# Patient Record
Sex: MALE | Race: WHITE | URBAN - METROPOLITAN AREA
[De-identification: names, ages, dates, MRNs, and addresses within clinical notes are randomized per-mention and may not be internally consistent; named-entity substitution may affect disease eponyms.]

---

## 2017-12-04 ENCOUNTER — OFFICE VISIT (OUTPATIENT)
Dept: INTERNAL MEDICINE CLINIC | Age: 22
End: 2017-12-04

## 2017-12-04 VITALS
BODY MASS INDEX: 18.79 KG/M2 | HEIGHT: 68 IN | OXYGEN SATURATION: 99 % | WEIGHT: 124 LBS | HEART RATE: 91 BPM | SYSTOLIC BLOOD PRESSURE: 119 MMHG | DIASTOLIC BLOOD PRESSURE: 84 MMHG

## 2017-12-04 DIAGNOSIS — F41.9 ANXIETY AND DEPRESSION: ICD-10-CM

## 2017-12-04 DIAGNOSIS — F32.A ANXIETY AND DEPRESSION: ICD-10-CM

## 2017-12-04 DIAGNOSIS — Z00.00 PHYSICAL EXAM: Primary | ICD-10-CM

## 2017-12-04 DIAGNOSIS — M25.512 ACUTE PAIN OF LEFT SHOULDER: ICD-10-CM

## 2017-12-04 RX ORDER — ALPRAZOLAM 0.25 MG/1
0.25 TABLET ORAL
Qty: 60 TAB | Refills: 0 | Status: SHIPPED | OUTPATIENT
Start: 2017-12-04

## 2017-12-04 NOTE — PROGRESS NOTES
Subjective:  Mr. Danis Hansen is a pleasant 20year old gentleman who comes in today for a complete history and physical.    History of Present Illness:  He does give a three day history of left shoulder pain. He denied any injury and was not aware of any precipitating factors. He thinks he might have just slept on the wrong way on this left shoulder. He denies any neck pain. He denies any numbness or tingling down the left arm. He denies any weakness. The pain is worse if he tries to lift up his arm. He has not tried any OTC medication. A second complaint is his ongoing anxiety. In the past he was treated for depression and gradually took himself off his Sertraline. He has been off the medication for about a year and is doing well. He also is concerned he could have ADHD since his mother has been treated in the past.  At times he's having difficulty remaining focused. He denies any suicidal thoughts. Past Medical History/Surgeries:  None. Illnesses:  1. History of depression as noted above. 2. History of migraine headaches, although he's not been bothered in many months. Family History:  Father is in his 62s, alive with mild dementia and OCD. Mother is in her 46s, alive with ADHD. Two siblings are living and well. Social History:  He is single. 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 He lives alone. Allergies: To Bactrim, which causes rash. Medications:  None. Habits:  Nonsmoker and denies any use of street drugs. He drinks very little. Review of Systems:  HEENT:  As noted previously, he did have a history of migraine headaches, but tells me he's not been bothered in a long time. He denies any dizziness or blurred vision. He does wear glasses. His last eye exam was about a year ago. CVR:  Denies any chest pain, but does note occasional palpitations if he gets anxious. He denies any syncopal episode. He denies any shortness of breath, cough, wheezing, PND or orthopnea. Denies any ankle edema. GI:  Appetite is good. His weight is stable. Does have a normal bowel pattern without presence of blood in his stools or melena. :  He denies any urinary symptoms. Immunizations:  He has already gotten his flu vaccine. Physical Examination:  GENERAL:  On exam he is a pleasant, WDWN, young gentleman in NAD. He is alert and oriented. VITALS:  BP: 119/84. P: 91.  O2 sat: 99. WT: 124 lbs. HT: 5'8\". HEENT:  Normocephalic, atraumatic. PERRLA, EOMI. TMs normal.  Mouth mucosa pink. Tongue midline. Pharynx normal.  NECK:  Supple without adenopathy, thyromegaly or carotid bruits. CHEST:  Lungs were clear to auscultation, no rales or wheezes. CARDIAC:  Heart regular rhythm without murmur or gallop. ABDOMEN:  Bowel sounds present. Soft, non tender, no organomegaly or masses. EXTREMITIES:  No edema or calf tenderness. Distal pulses were present. Left shoulder - he does have full ROM, although he complains of a little bit of discomfort on forward elevation. No pain on palpation of the left shoulder. No rash, increase in warmth or redness noted. NEUROLOGIC:   He had excellent strength in the upper and lower extremities against resistance. Sensation is preserved. Reflexes are 2+ and symmetrical.  Romberg is negative. He had excellent coordination. Impression:  1. Left shoulder pain. 2. Chronic anxiety. Plan:  1. In terms of his shoulder he was asked to start using Aleve, two in the morning and two at bedtime with food. He may use warmth alternating with heat. Should he fail to improve over the next two weeks then he will return for xray of his shoulder and probable referral to ortho. 2. In terms of anxiety it was opted to start him on Xanax 0.25 mg twice daily as needed. He will contact me should he have any concern with the medication. 3. I also am referring him for ADHD testing. I will call him as soon as I have the results.   4. He was fasting this morning so therefore I did some baseline blood work and I will call him as soon as I have the results.

## 2017-12-04 NOTE — PROGRESS NOTES
Nilton Comes presents with   Chief Complaint   Patient presents with    Physical   Patient here for a physical.  He is fasting. Is taking no medications at this time. Is working as an office assist    1. Have you been to the ER, urgent care clinic since your last visit? Hospitalized since your last visit? No    2. Have you seen or consulted any other health care providers outside of the [address] [place] [address] since your last visit? Include any pap smears or colon screening.  No

## 2017-12-04 NOTE — PATIENT INSTRUCTIONS

## 2017-12-05 LAB
ALBUMIN SERPL-MCNC: 4.9 G/DL (ref 3.9–5.4)
ALKALINE PHOS POC: 77 U/L (ref 38–126)
ALT SERPL-CCNC: 22 U/L (ref 9–52)
AST SERPL-CCNC: 32 U/L (ref 14–36)
BACTERIA UA POCT, BACTPOCT: ABNORMAL
BILIRUB UR QL STRIP: NEGATIVE
BUN BLD-MCNC: 8 MG/DL (ref 9–20)
CALCIUM BLD-MCNC: 10 MG/DL (ref 8.4–10.2)
CASTS UA POCT: ABNORMAL
CHLORIDE BLD-SCNC: 101 MMOL/L (ref 98–107)
CHOLEST SERPL-MCNC: 131 MG/DL (ref 0–200)
CLUE CELLS, CLUEPOCT: NEGATIVE
CO2 POC: 27 MMOL/L (ref 22–32)
CREAT BLD-MCNC: 0.6 MG/DL (ref 0.8–1.5)
CRYSTALS UA POCT, CRYSPOCT: NEGATIVE
EGFR (POC): 141.7
EPITHELIAL CELLS POCT: NEGATIVE
GLUCOSE POC: 84 MG/DL (ref 75–110)
GLUCOSE UR-MCNC: NEGATIVE MG/DL
GRAN# POC: 1.9 K/UL (ref 2–7.8)
GRAN% POC: 47.3 % (ref 37–92)
HCT VFR BLD CALC: 44.8 % (ref 37–51)
HDLC SERPL-MCNC: 67 MG/DL (ref 35–130)
HGB BLD-MCNC: 15.2 G/DL (ref 12–18)
KETONES P FAST UR STRIP-MCNC: NEGATIVE MG/DL
LDL CHOLESTEROL POC: 36.8 MG/DL (ref 0–130)
LY# POC: 1.6 K/UL (ref 0.6–4.1)
LY% POC: 44.8 % (ref 10–58.5)
MCH RBC QN: 30.2 PG (ref 26–32)
MCHC RBC-ENTMCNC: 34 G/DL (ref 30–36)
MCV RBC: 89 FL (ref 80–97)
MID #, POC: 0.2 K/UL (ref 0–1.8)
MID% POC: 7.9 % (ref 0.1–24)
MUCUS UA POCT, MUCPOCT: ABNORMAL
NON-HDL GOAL (POC): 27.2
PH UR STRIP: 8 [PH] (ref 5–7)
PLATELET # BLD: 242 K/UL (ref 140–440)
POTASSIUM SERPL-SCNC: 4.9 MMOL/L (ref 3.6–5)
PROT SERPL-MCNC: 7.8 G/DL (ref 6.3–8.2)
PROT UR QL STRIP: NEGATIVE
RBC # BLD: 5.04 M/UL (ref 4.2–6.3)
RBC UA POCT, RBCPOCT: ABNORMAL
SODIUM SERPL-SCNC: 142 MMOL/L (ref 137–145)
SP GR UR STRIP: 1.01 (ref 1.01–1.02)
T4 FREE SERPL-MCNC: 0.93 NG/DL (ref 0.71–1.85)
TCHOL/HDL RATIO (POC): 2 (ref 0–4)
TOTAL BILIRUBIN POC: 1 MG/DL (ref 0.2–1.3)
TRICH UA POCT, TRICHPOC: NEGATIVE
TRIGL SERPL-MCNC: 184 MG/DL (ref 0–200)
TSH BLD-ACNC: 2.39 UIU/ML (ref 0.4–4.2)
UA UROBILINOGEN AMB POC: NORMAL (ref 0.2–1)
URINALYSIS CLARITY POC: CLEAR
URINALYSIS COLOR POC: ABNORMAL
URINE BLOOD POC: NEGATIVE
URINE CULT COMMENT, POCT: ABNORMAL
URINE LEUKOCYTES POC: NEGATIVE
URINE NITRITES POC: NEGATIVE
WBC # BLD: 3.7 K/UL (ref 4.1–10.9)
WBC UA POCT, WBCPOCT: 0
YEAST UA POCT, YEASTPOC: NEGATIVE

## 2018-04-10 ENCOUNTER — OFFICE VISIT (OUTPATIENT)
Dept: CARDIOLOGY CLINIC | Age: 23
End: 2018-04-10

## 2018-04-10 VITALS
HEART RATE: 78 BPM | DIASTOLIC BLOOD PRESSURE: 64 MMHG | OXYGEN SATURATION: 98 % | RESPIRATION RATE: 20 BRPM | BODY MASS INDEX: 18.7 KG/M2 | SYSTOLIC BLOOD PRESSURE: 90 MMHG | WEIGHT: 123.4 LBS | HEIGHT: 68 IN

## 2018-04-10 DIAGNOSIS — R00.2 HEART PALPITATIONS: Primary | ICD-10-CM

## 2018-04-10 DIAGNOSIS — R94.31 ABNORMAL EKG: ICD-10-CM

## 2018-04-10 NOTE — PROGRESS NOTES
HISTORY OF PRESENT ILLNESS  Bryson June is a 20 y.o. male. He is self referred for an abnormal EKG and palpitations. He was supposed to drink alcohol as part of a testing for DUI for a friend who is going to be in the [facility] but medical screening beforehand showed a heart rate of 120 beats per minute and may have been irregular. I do not have those strips. He decided to come here for further evaluation. He has no prior history of heart disease and no family history of heart disease. He does not exercise. He does not smoke cigarettes. He does drink two cups of coffee a day and about two alcoholic beverages a day. He works two jobs. He may have had a heart murmur at some point in the past.          HPI  Patient Active Problem List   Diagnosis Code    Heart palpitations R00.2     Current Outpatient Prescriptions   Medication Sig Dispense Refill    ALPRAZolam (XANAX) 0.25 mg tablet Take 1 Tab by mouth two (2) times daily as needed for Anxiety. Max Daily Amount: 0.5 mg. 60 Tab 0     Past Medical History:   Diagnosis Date    Depression     Infection of right ear lobe      History reviewed. No pertinent surgical history. Review of Systems   Cardiovascular: Positive for palpitations. All other systems reviewed and are negative. Visit Vitals    BP 90/64 (BP 1 Location: Left arm, BP Patient Position: Sitting)    Pulse 78    Resp 20    Ht 5' 8\" (1.727 m)    Wt 123 lb 6.4 oz (56 kg)    SpO2 98%    BMI 18.76 kg/m2       Physical Exam   Constitutional: He is oriented to person, place, and time. He appears well-nourished. HENT:   Head: Atraumatic. Eyes: Conjunctivae are normal.   Neck: Neck supple. Cardiovascular: Normal rate, regular rhythm and normal heart sounds. Exam reveals no gallop and no friction rub. No murmur heard. Pulmonary/Chest: Breath sounds normal. He has no wheezes. Abdominal: Bowel sounds are normal.   Musculoskeletal: He exhibits no edema.    Neurological: He is oriented to person, place, and time. Skin: Skin is dry. Nursing note and vitals reviewed. ASSESSMENT and PLAN  His exam and EKG are unremarkable for a person of his age. He does seem to have palpitations every day so I will order a 48 hour Holter monitor. He will also return for an echocardiogram and I will see him back in the office when testing is completed. I have asked him to try to reduce his intake of both alcohol and caffeine in the meantime.

## 2018-04-10 NOTE — MR AVS SNAPSHOT
[address] [facility] [address] [facility] 
[phone] Patient: Alexx Bass MRN: [id number] NE:[birth year] Visit Information Date & Time Provider Department Dept. Phone Encounter #  
 4/10/2018  9:20 AM Tate Li MD [facility] Myranda Banegas [phone] [id number] Your Appointments 12/10/2018  8:30 AM  
Complete Physical with AURELIO Aldana [facility] ([facility]) Appt Note: CPE, fasting [address] [id number] [address] [address] [address] Upcoming Health Maintenance Date Due DTaP/Tdap/Td series (1 - Tdap) 5/5/2015 Influenza Age 5 to Adult 8/1/2017 Allergies as of 4/10/2018  Review Complete On: 4/10/2018 By: Cat Barr RN Severity Noted Reaction Type Reactions Bactrim [Sulfamethoprim]  12/04/2017    Rash Current Immunizations  Never Reviewed No immunizations on file. Not reviewed this visit You Were Diagnosed With   
  
 Codes Comments Abnormal EKG    -  Primary ICD-10-CM: R94.31 
ICD-9-CM: 794.31 Vitals BP Pulse Resp Height(growth percentile) Weight(growth percentile) SpO2  
 90/64 (BP 1 Location: Left arm, BP Patient Position: Sitting) 78 20 5' 8\" (1.727 m) 123 lb 6.4 oz (56 kg) 98% BMI Smoking Status 18.76 kg/m2 Former Smoker Vitals History BMI and BSA Data Body Mass Index Body Surface Area 18.76 kg/m 2 1.64 m 2 Preferred Pharmacy Pharmacy Name Phone Wil [facility]eka [address], [address]  [address] [phone] Your Updated Medication List  
  
   
This list is accurate as of 4/10/18  9:26 AM.  Always use your most recent med list.  
  
  
  
  
 ALPRAZolam 0.25 mg tablet Commonly known as:  Marlon Oneill Take 1 Tab by mouth two (2) times daily as needed for Anxiety. Max Daily Amount: 0.5 mg. We Performed the Following AMB POC EKG ROUTINE W/ 12 LEADS, INTER & REP [79711 CPT(R)] Introducing [facility] & [facility] SERVICES! Dear Evy Donnelly: 
Thank you for requesting a [employer] account. Our records indicate that you already have an active [employer] account. You can access your account anytime at [email or web address] Did you know that you can access your hospital and ER discharge instructions at any time in [employer]? You can also review all of your test results from your hospital stay or ER visit. Additional Information If you have questions, please visit the Frequently Asked Questions section of the [employer] website at [email or web address] Remember, [employer] is NOT to be used for urgent needs. For medical emergencies, dial 911. Now available from your iPhone and Android! Please provide this summary of care documentation to your next provider. Your primary care clinician is listed as Isa Mejia. If you have any questions after today's visit, please call [phone].

## 2018-04-10 NOTE — PROGRESS NOTES
Chief Complaint   Patient presents with    Abnormal EKG     self referral.  Complaints of occasional chest discomfort/shortness of breath. Denies dizziness/swelling. 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 EKG done that revealed \"irregular rhythm\". Does not have copy of EKG.

## 2018-04-16 ENCOUNTER — CLINICAL SUPPORT (OUTPATIENT)
Dept: CARDIOLOGY CLINIC | Age: 23
End: 2018-04-16

## 2018-04-16 DIAGNOSIS — R00.2 PALPITATIONS: Primary | ICD-10-CM

## 2018-04-16 DIAGNOSIS — Q23.1 CONGENITAL BICUSPID AORTIC VALVE: ICD-10-CM

## 2018-04-24 ENCOUNTER — OFFICE VISIT (OUTPATIENT)
Dept: CARDIOLOGY CLINIC | Age: 23
End: 2018-04-24

## 2018-04-24 VITALS
HEART RATE: 88 BPM | DIASTOLIC BLOOD PRESSURE: 68 MMHG | WEIGHT: 123.8 LBS | HEIGHT: 68 IN | RESPIRATION RATE: 16 BRPM | BODY MASS INDEX: 18.76 KG/M2 | OXYGEN SATURATION: 99 % | SYSTOLIC BLOOD PRESSURE: 102 MMHG

## 2018-04-24 DIAGNOSIS — R00.2 HEART PALPITATIONS: ICD-10-CM

## 2018-04-24 DIAGNOSIS — R94.31 ABNORMAL EKG: ICD-10-CM

## 2018-04-24 DIAGNOSIS — Q23.1 CONGENITAL BICUSPID AORTIC VALVE: ICD-10-CM

## 2018-04-24 DIAGNOSIS — R00.2 PALPITATIONS: Primary | ICD-10-CM

## 2018-04-24 NOTE — PROGRESS NOTES
HISTORY OF PRESENT ILLNESS  Kendall Orozco is a 20 y.o. male. He returns for follow up of tachycardia of uncertain etiology. His heart rate was 120 beats per minute apparently. He wore a monitor and it showed frequent APC's but no tachycardia. He still drinks two cups of coffee a day and has two alcoholic beverages a day. An echocardiogram showed normal heart function but he did have a bicuspid aortic valve with mild aortic dilatation beyond the valve. HPI  Patient Active Problem List   Diagnosis Code    Heart palpitations R00.2     Current Outpatient Prescriptions   Medication Sig Dispense Refill    ALPRAZolam (XANAX) 0.25 mg tablet Take 1 Tab by mouth two (2) times daily as needed for Anxiety. Max Daily Amount: 0.5 mg. 60 Tab 0     Past Medical History:   Diagnosis Date    Depression     Infection of right ear lobe        Review of Systems   Cardiovascular: Positive for palpitations. All other systems reviewed and are negative. Visit Vitals    /68 (BP 1 Location: Left arm)    Pulse 88    Resp 16    Ht 5' 8\" (1.727 m)    Wt 123 lb 12.8 oz (56.2 kg)    SpO2 99%    BMI 18.82 kg/m2       Physical Exam   Constitutional: He is oriented to person, place, and time. He appears well-nourished. HENT:   Head: Atraumatic. Eyes: Conjunctivae are normal.   Neck: Neck supple. Cardiovascular: Normal rate, regular rhythm and normal heart sounds. Exam reveals no gallop and no friction rub. No murmur heard. Pulmonary/Chest: Breath sounds normal. He has no wheezes. Abdominal: Bowel sounds are normal.   Musculoskeletal: He exhibits no edema. Neurological: He is oriented to person, place, and time. Skin: Skin is dry. Psychiatric: His behavior is normal.   Nursing note and vitals reviewed. ASSESSMENT and PLAN  He has no significant issues at this time, but his bicuspid aortic valve with mild re-dilatation bear follow up. I suggested he try to reduce his coffee and alcohol intake.  I will see him back in one year with an echo at that time.

## 2018-04-24 NOTE — MR AVS SNAPSHOT
[address] [facility] [address] [place] [address] 
[phone] Patient: Cherl Fleischer MRN: [id number] LSB:[birth year] Visit Information Date & Time Provider Department Dept. Phone Encounter #  
 4/24/2018 11:00 AM Ibrahima Haines MD [facility] Rose Pena [phone] [id number] Your Appointments 12/10/2018  8:30 AM  
Complete Physical with Ardeth Bohr, NP  
[facility] Appt Note: CPE, fasting [address] [phone] [address] [address] Upcoming Health Maintenance Date Due DTaP/Tdap/Td series (1 - Tdap) 5/5/2015 Influenza Age 5 to Adult 8/1/2017 Allergies as of 4/24/2018  Review Complete On: 4/24/2018 By: Logan Perez Severity Noted Reaction Type Reactions Bactrim [Sulfamethoprim]  12/04/2017    Rash Current Immunizations  Never Reviewed No immunizations on file. Not reviewed this visit Vitals BP Pulse Resp Height(growth percentile) Weight(growth percentile) SpO2  
 102/68 (BP 1 Location: Left arm) 88 16 5' 8\" (1.727 m) 123 lb 12.8 oz (56.2 kg) 99% BMI Smoking Status 18.82 kg/m2 Former Smoker Vitals History BMI and BSA Data Body Mass Index Body Surface Area  
 18.82 kg/m 2 1.64 m 2 Preferred Pharmacy Pharmacy Name Phone Patricia Whittaker [address] [place] [address] Dr Sanchez, [address] [facility] [place] [phone] Your Updated Medication List  
  
   
This list is accurate as of 4/24/18 11:21 AM.  Always use your most recent med list.  
  
  
  
  
 ALPRAZolam 0.25 mg tablet Commonly known as:  Larrisarkis Sark Take 1 Tab by mouth two (2) times daily as needed for Anxiety. Max Daily Amount: 0.5 mg. Introducing [facility] & HEALTH SERVICES! Dear Oscar Qureshi: Thank you for requesting a [employer] account. Our records indicate that you already have an active [employer] account. You can access your account anytime at [email or web address] Did you know that you can access your hospital and ER discharge instructions at any time in [employer]? You can also review all of your test results from your hospital stay or ER visit. Additional Information If you have questions, please visit the Frequently Asked Questions section of the [employer] website at [email or web address] Remember, [employer] is NOT to be used for urgent needs. For medical emergencies, dial 911. Now available from your iPhone and Android! Please provide this summary of care documentation to your next provider. Your primary care clinician is listed as Shaun Quiñones. If you have any questions after today's visit, please call [phone].